# Patient Record
(demographics unavailable — no encounter records)

---

## 2024-10-10 NOTE — HISTORY OF PRESENT ILLNESS
[EENT/Resp Symptoms] : EENT/RESPIRATORY SYMPTOMS [Vomiting] : vomiting [Fever] : no fever [Ear Pain] : no ear pain [Nasal Congestion] : no nasal congestion [Sore Throat] : no sore throat [Chest Pain] : no chest pain [Cough] : no cough [Decreased Appetite] : no decreased appetite [Decreased Urine Output] : no decreased urine output [FreeTextEntry5] : also on antibiotic for UTI  [de-identified] : no dysuria urinary incontinence improved stooled yesterday, normal. Non painful  bactrim, no allergies  emesis 20 mins after

## 2025-07-18 NOTE — PHYSICAL EXAM

## 2025-07-18 NOTE — DISCUSSION/SUMMARY
[Normal Growth] : growth [Normal Development] : development  [No Elimination Concerns] : elimination [Continue Regimen] : feeding [No Skin Concerns] : skin [Normal Sleep Pattern] : sleep [None] : no medical problems [Anticipatory Guidance Given] : Anticipatory guidance addressed as per the history of present illness section [Physical Growth and Development] : physical growth and development [Social and Academic Competence] : social and academic competence [Emotional Well-Being] : emotional well-being [Risk Reduction] : risk reduction [Violence and Injury Prevention] : violence and injury prevention [No Vaccines] : no vaccines needed [No Medications] : ~He/She~ is not on any medications [Patient] : patient [Parent/Guardian] : Parent/Guardian [FreeTextEntry1] : Continue balanced diet with all food groups. Brush teeth twice a day with toothbrush. Recommend visit to dentist. Help child to maintain consistent daily routines and sleep schedule. Personal hygiene and puberty explained. School discussed. Ensure home is safe. Teach child about personal safety. Use consistent, positive discipline. Limit screen time to no more than 2 hours per day. Encourage physical activity. Return 1 year for routine well child check.  Will trial topical benzoyl peroxide-antibiotic. Recommend daily noncomedogenic moisturizer and face wash. If no improvement refer to Dermatology.  Positive depression screen.  Body image concerns.  Family arguments.  Will refer for counseling.  Much emotional support provided.

## 2025-07-18 NOTE — HISTORY OF PRESENT ILLNESS
[Mother] : mother [Yes] : Patient goes to dentist yearly [Days of Bleeding: _____] : Days of bleeding: [unfilled] [Cycle Length: _____ days] : Cycle Length: [unfilled] days [Age of Menarche: ____] : Age of Menarche: [unfilled] [Acne] : acne [Has family members/adults to turn to for help] : has family members/adults to turn to for help [Is permitted and is able to make independent decisions] : Is permitted and is able to make independent decisions [Sleep Concerns] : sleep concerns [Grade: ____] : Grade: [unfilled] [Normal Performance] : normal performance [Normal Homework] : normal homework [Eats regular meals including adequate fruits and vegetables] : eats regular meals including adequate fruits and vegetables [Drinks non-sweetened liquids] : drinks non-sweetened liquids  [Calcium source] : calcium source [Has friends] : has friends [At least 1 hour of physical activity a day] : at least 1 hour of physical activity a day [Toothpaste] : Primary Fluoride Source: Toothpaste [Up to date] : Up to date [LMP: _____] : LMP: [unfilled] [Irregular menses] : irregular menses [Normal Behavior/Attention] : normal behavior/attention [Heavy Bleeding] : no heavy bleeding [Painful Cramps] : no painful cramps [Tampon Use] : no tampon use [Eats meals with family] : does not eat meals with family [Has concerns about body or appearance] : does not have concerns about body or appearance [Screen time (except homework) less than 2 hours a day] : no screen time (except homework) less than 2 hours a day [Has interests/participates in community activities/volunteers] : does not have interests/participates in community activities/volunteers [Uses electronic nicotine delivery system] : does not use electronic nicotine delivery system [Exposure to electronic nicotine delivery system] : no exposure to electronic nicotine delivery system [Uses tobacco] : does not use tobacco [Exposure to tobacco] : no exposure to tobacco [Uses drugs] : does not use drugs  [Exposure to drugs] : no exposure to drugs [Drinks alcohol] : does not drink alcohol [Exposure to alcohol] : no exposure to alcohol [Uses safety belts/safety equipment] : uses safety belts/safety equipment  [Has peer relationships free of violence] : has peer relationships free of violence [Has ways to cope with stress] : does not have ways to cope with stress [Displays self-confidence] : does not display self-confidence [Has problems with sleep] : has problems with sleep [Gets depressed, anxious, or irritable/has mood swings] : gets depressed, anxious, or irritable/has mood swings [Has thought about hurting self or considered suicide] : has not thought about hurting self or considered suicide [With Teen] : teen [With Parent/Guardian] : parent/guardian [de-identified] : body image issues, family concerns [de-identified] : Denies suicidal thoughts and ideations.  Discussed with adolescent confidentially. [FreeTextEntry8] : body image issues [NO] : No [FreeTextEntry1] : Oldest brother has left the house for the past 2 weeks and family does not know his location. Also, family members regularly fight and scream in house. As per mother, father does not contribute financially to household which has been burdening her as she is responsible for everyone.  Father is an artist and just works on his projects.